# Patient Record
Sex: MALE | Race: WHITE | NOT HISPANIC OR LATINO | Employment: OTHER | URBAN - METROPOLITAN AREA
[De-identification: names, ages, dates, MRNs, and addresses within clinical notes are randomized per-mention and may not be internally consistent; named-entity substitution may affect disease eponyms.]

---

## 2019-12-03 PROBLEM — C09.9 SQUAMOUS CELL CARCINOMA OF TONSIL: Status: ACTIVE | Noted: 2019-12-03

## 2019-12-18 PROBLEM — M54.2 NECK PAIN: Status: ACTIVE | Noted: 2019-12-18

## 2020-01-13 PROBLEM — M54.12 CERVICAL RADICULOPATHY AT C6: Status: ACTIVE | Noted: 2020-01-13

## 2020-02-12 PROBLEM — M77.11 LATERAL EPICONDYLITIS OF RIGHT ELBOW: Status: ACTIVE | Noted: 2020-02-12

## 2021-05-12 ENCOUNTER — PATIENT MESSAGE (OUTPATIENT)
Dept: RESEARCH | Facility: HOSPITAL | Age: 61
End: 2021-05-12

## 2025-03-14 ENCOUNTER — OUTPATIENT CASE MANAGEMENT (OUTPATIENT)
Dept: ADMINISTRATIVE | Facility: OTHER | Age: 65
End: 2025-03-14

## 2025-03-14 NOTE — PROGRESS NOTES
Received a consult to assist pt with obtaining a flexi-touch device from [employer] for neck lymphedema; Sw called pt at [phone] regarding consult and pt stated that he had been trying to get a flexi-touch device since 2016, but never got it and would like assistance to get device; Sw informed pt that a referral will be submitted to [id number] for assistance; pt verbalized understanding and upon providing pt with contact information for [facility] pt asked for information to be sent to his email and pt provided email address information; Natasha sent [facility] information to email address pt provided; Sw faxed a referral to [id number] at [phone] for assistance; no other needs were identified at that time; Sw will continue to follow pt to assist with needs and concerns.      ANTONIA Tavarez    Ext. [phone]  Pager #[phone]

## 2025-03-20 PROBLEM — I89.0 LYMPHEDEMA DUE TO RADIATION: Status: ACTIVE | Noted: 2025-03-20

## 2025-04-15 ENCOUNTER — OUTPATIENT CASE MANAGEMENT (OUTPATIENT)
Dept: ADMINISTRATIVE | Facility: OTHER | Age: 65
End: 2025-04-15

## 2025-04-15 NOTE — PROGRESS NOTES
Sent a notification via email to Tayler Stallworth with [facility] requesting an update on pt receiving flexi-touch device for lymphedema; Sw will continue to follow pt to assist with needs and concerns.    ANTONIA Tavarez    Ext. [phone]  Pager #[phone]